# Patient Record
Sex: MALE | Race: WHITE | Employment: UNEMPLOYED | URBAN - METROPOLITAN AREA
[De-identification: names, ages, dates, MRNs, and addresses within clinical notes are randomized per-mention and may not be internally consistent; named-entity substitution may affect disease eponyms.]

---

## 2017-02-25 ENCOUNTER — HOSPITAL ENCOUNTER (EMERGENCY)
Age: 27
Discharge: HOME | End: 2017-02-25
Attending: EMERGENCY MEDICINE
Payer: COMMERCIAL

## 2017-02-25 VITALS
DIASTOLIC BLOOD PRESSURE: 76 MMHG | SYSTOLIC BLOOD PRESSURE: 135 MMHG | HEART RATE: 88 BPM | TEMPERATURE: 97.6 F | WEIGHT: 195 LBS | OXYGEN SATURATION: 99 % | BODY MASS INDEX: 26.41 KG/M2 | HEIGHT: 72 IN | RESPIRATION RATE: 18 BRPM

## 2017-02-25 DIAGNOSIS — S16.1XXA CERVICAL STRAIN, INITIAL ENCOUNTER: Primary | ICD-10-CM

## 2017-02-25 PROCEDURE — 99283 EMERGENCY DEPT VISIT LOW MDM: CPT

## 2017-02-25 RX ORDER — METHOCARBAMOL 750 MG/1
750 TABLET, FILM COATED ORAL 4 TIMES DAILY
Qty: 20 TAB | Refills: 0 | Status: SHIPPED | OUTPATIENT
Start: 2017-02-25 | End: 2017-05-18

## 2017-02-25 RX ORDER — NAPROXEN 375 MG/1
375 TABLET ORAL 2 TIMES DAILY WITH MEALS
Qty: 30 TAB | Refills: 0 | Status: SHIPPED | OUTPATIENT
Start: 2017-02-25 | End: 2017-05-18
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
## 2017-02-25 NOTE — DISCHARGE INSTRUCTIONS
Neck Strain: Care Instructions  Your Care Instructions  You have strained the muscles and ligaments in your neck. A sudden, awkward movement can strain the neck. This often occurs with falls or car accidents or during certain sports. Everyday activities like working on a computer or sleeping can also cause neck strain if they force you to hold your neck in an awkward position for a long time. It is common for neck pain to get worse for a day or two after an injury, but it should start to feel better after that. You may have more pain and stiffness for several days before it gets better. This is expected. It may take a few weeks or longer for it to heal completely. Good home treatment can help you get better faster and avoid future neck problems. Follow-up care is a key part of your treatment and safety. Be sure to make and go to all appointments, and call your doctor if you are having problems. It's also a good idea to know your test results and keep a list of the medicines you take. How can you care for yourself at home? · If you were given a neck brace (cervical collar) to limit neck motion, wear it as instructed for as many days as your doctor tells you to. Do not wear it longer than you were told to. Wearing a brace for too long can make neck stiffness worse and weaken the neck muscles. · You can try using heat or ice to see if it helps. ¨ Try using a heating pad on a low or medium setting for 15 to 20 minutes every 2 to 3 hours. Try a warm shower in place of one session with the heating pad. You can also buy single-use heat wraps that last up to 8 hours. ¨ You can also try an ice pack for 10 to 15 minutes every 2 to 3 hours. · Take pain medicines exactly as directed. ¨ If the doctor gave you a prescription medicine for pain, take it as prescribed. ¨ If you are not taking a prescription pain medicine, ask your doctor if you can take an over-the-counter medicine.   · Gently rub the area to relieve pain and help with blood flow. Do not massage the area if it hurts to do so. · Do not do anything that makes the pain worse. Take it easy for a couple of days. You can do your usual activities if they do not hurt your neck or put it at risk for more stress or injury. · Try sleeping on a special neck pillow. Place it under your neck, not under your head. Placing a tightly rolled-up towel under your neck while you sleep will also work. If you use a neck pillow or rolled towel, do not use your regular pillow at the same time. · To prevent future neck pain, do exercises to stretch and strengthen your neck and back. Learn how to use good posture, safe lifting techniques, and proper body mechanics. When should you call for help? Call 911 anytime you think you may need emergency care. For example, call if:  · You are unable to move an arm or a leg at all. Call your doctor now or seek immediate medical care if:  · You have new or worse symptoms in your arms, legs, chest, belly, or buttocks. Symptoms may include:  ¨ Numbness or tingling. ¨ Weakness. ¨ Pain. · You lose bladder or bowel control. Watch closely for changes in your health, and be sure to contact your doctor if:  · You are not getting better as expected. Where can you learn more? Go to http://julianna-jose.info/. Enter M253 in the search box to learn more about \"Neck Strain: Care Instructions. \"  Current as of: May 23, 2016  Content Version: 11.1  © 1896-8493 [employer]. Care instructions adapted under license by [employer] (which disclaims liability or warranty for this information). If you have questions about a medical condition or this instruction, always ask your healthcare professional. [address] any warranty or liability for your use of this information.

## 2024-03-28 RX ORDER — TADALAFIL 5 MG/1
5 TABLET ORAL PRN
COMMUNITY

## 2024-03-28 NOTE — PROGRESS NOTES
Instructions for your surgery at [facility]      Today's Date:  3/28/2024      Patient's Name:  Isidro Calix           Surgery Date:  04/08/2024              Please enter the main entrance of the hospital and check-in at the  located in the lobby. Once checked in at the , you will take the elevators to the second floor, and report to the waiting room on the left. The room will say Procedure Registration.    Do NOT eat or drink anything, including candy, gum, or ice chips after midnight prior to your surgery, unless you have specific instructions from your surgeon or anesthesia provider to do so.  Brush your teeth before coming to the hospital. You may swish with water, but do not swallow.  No smoking/Vaping/E-Cigarettes 24 hours prior to the day of surgery.  No alcohol 24 hours prior to the day of surgery.  No recreational drugs for one week prior to the day of surgery.  Bring Photo ID, Insurance information, and Co-pay if required on day of surgery.  Bring in pertinent legal documents, such as, Medical Power of , DNR, Advance Directive, etc.  Leave all valuables, including money/purse, at home.  Remove all jewelry, including ALL body piercings, nail polish, acrylic nails, and makeup (including mascara); no lotions, powders, deodorant, or perfume/cologne/after shave on the skin.  Follow instruction for Hibiclens washes and CHG wipes from surgeon's office.   Glasses and dentures may be worn to the hospital. They must be removed prior to surgery. Please bring case/container for glasses or dentures.   Contact lenses should not be worn on day of surgery.   Call your doctor's office if symptoms of a cold or illness develop within 24-48 hours prior to your surgery.  Call your doctor's office if you have any questions concerning insurance or co-pays.  15. AN ADULT (relative or friend 18 years or older) MUST DRIVE YOU HOME AFTER YOUR SURGERY.  16. Please make

## 2024-04-02 ENCOUNTER — HOSPITAL ENCOUNTER (OUTPATIENT)
Facility: HOSPITAL | Age: 34
Discharge: HOME | End: 2024-04-05
Payer: COMMERCIAL

## 2024-04-02 ENCOUNTER — TRANSCRIBE ORDERS (OUTPATIENT)
Facility: HOSPITAL | Age: 34
End: 2024-04-02

## 2024-04-02 DIAGNOSIS — D48.5 NEOPLASM OF UNCERTAIN BEHAVIOR OF SKIN: ICD-10-CM

## 2024-04-02 DIAGNOSIS — D48.5 NEOPLASM OF UNCERTAIN BEHAVIOR OF SKIN: Primary | ICD-10-CM

## 2024-04-02 LAB
ANION GAP SERPL CALC-SCNC: 2 MMOL/L (ref 3–18)
APTT PPP: 28.2 SEC (ref 23–36.4)
BASOPHILS # BLD: 0 K/UL (ref 0–0.1)
BASOPHILS NFR BLD: 1 % (ref 0–2)
BUN SERPL-MCNC: 16 MG/DL (ref 7–18)
BUN/CREAT SERPL: 17 (ref 12–20)
CALCIUM SERPL-MCNC: 9.2 MG/DL (ref 8.5–10.1)
CHLORIDE SERPL-SCNC: 105 MMOL/L (ref 100–111)
CO2 SERPL-SCNC: 32 MMOL/L (ref 21–32)
CREAT SERPL-MCNC: 0.92 MG/DL (ref 0.6–1.3)
DIFFERENTIAL METHOD BLD: ABNORMAL
EOSINOPHIL # BLD: 0.1 K/UL (ref 0–0.4)
EOSINOPHIL NFR BLD: 3 % (ref 0–5)
ERYTHROCYTE [DISTWIDTH] IN BLOOD BY AUTOMATED COUNT: 11.7 % (ref 11.6–14.5)
GLUCOSE SERPL-MCNC: 98 MG/DL (ref 74–99)
HCT VFR BLD AUTO: 44.8 % (ref 36–48)
HGB BLD-MCNC: 15.5 G/DL (ref 13–16)
IMM GRANULOCYTES # BLD AUTO: 0 K/UL (ref 0–0.04)
IMM GRANULOCYTES NFR BLD AUTO: 0 % (ref 0–0.5)
INR PPP: 1 (ref 0.9–1.1)
LYMPHOCYTES # BLD: 0.8 K/UL (ref 0.9–3.6)
LYMPHOCYTES NFR BLD: 20 % (ref 21–52)
MCH RBC QN AUTO: 29.6 PG (ref 24–34)
MCHC RBC AUTO-ENTMCNC: 34.6 G/DL (ref 31–37)
MCV RBC AUTO: 85.7 FL (ref 78–100)
MONOCYTES # BLD: 0.3 K/UL (ref 0.05–1.2)
MONOCYTES NFR BLD: 7 % (ref 3–10)
NEUTS SEG # BLD: 2.9 K/UL (ref 1.8–8)
NEUTS SEG NFR BLD: 69 % (ref 40–73)
NRBC # BLD: 0 K/UL (ref 0–0.01)
NRBC BLD-RTO: 0 PER 100 WBC
PLATELET # BLD AUTO: 241 K/UL (ref 135–420)
PMV BLD AUTO: 9.3 FL (ref 9.2–11.8)
POTASSIUM SERPL-SCNC: 4.9 MMOL/L (ref 3.5–5.5)
PROTHROMBIN TIME: 13.1 SEC (ref 11.9–14.7)
RBC # BLD AUTO: 5.23 M/UL (ref 4.35–5.65)
SODIUM SERPL-SCNC: 139 MMOL/L (ref 136–145)
WBC # BLD AUTO: 4.2 K/UL (ref 4.6–13.2)

## 2024-04-02 PROCEDURE — 80048 BASIC METABOLIC PNL TOTAL CA: CPT

## 2024-04-02 PROCEDURE — 36415 COLL VENOUS BLD VENIPUNCTURE: CPT

## 2024-04-02 PROCEDURE — 85025 COMPLETE CBC W/AUTO DIFF WBC: CPT

## 2024-04-02 PROCEDURE — 85610 PROTHROMBIN TIME: CPT

## 2024-04-02 PROCEDURE — 85730 THROMBOPLASTIN TIME PARTIAL: CPT

## 2024-04-05 ENCOUNTER — ANESTHESIA EVENT (OUTPATIENT)
Facility: HOSPITAL | Age: 34
End: 2024-04-05
Payer: COMMERCIAL

## 2024-04-08 ENCOUNTER — HOSPITAL ENCOUNTER (OUTPATIENT)
Facility: HOSPITAL | Age: 34
Setting detail: OUTPATIENT SURGERY
Discharge: HOME | End: 2024-04-08
Attending: PODIATRIST | Admitting: PODIATRIST
Payer: COMMERCIAL

## 2024-04-08 ENCOUNTER — ANESTHESIA (OUTPATIENT)
Facility: HOSPITAL | Age: 34
End: 2024-04-08
Payer: COMMERCIAL

## 2024-04-08 VITALS
DIASTOLIC BLOOD PRESSURE: 74 MMHG | RESPIRATION RATE: 16 BRPM | HEART RATE: 61 BPM | BODY MASS INDEX: 23.7 KG/M2 | TEMPERATURE: 96.9 F | HEIGHT: 73 IN | SYSTOLIC BLOOD PRESSURE: 114 MMHG | OXYGEN SATURATION: 100 % | WEIGHT: 178.8 LBS

## 2024-04-08 PROCEDURE — 6360000002 HC RX W HCPCS: Performed by: ANESTHESIOLOGY

## 2024-04-08 PROCEDURE — 7100000000 HC PACU RECOVERY - FIRST 15 MIN: Performed by: PODIATRIST

## 2024-04-08 PROCEDURE — 3700000000 HC ANESTHESIA ATTENDED CARE: Performed by: PODIATRIST

## 2024-04-08 PROCEDURE — 7100000011 HC PHASE II RECOVERY - ADDTL 15 MIN: Performed by: PODIATRIST

## 2024-04-08 PROCEDURE — 2500000003 HC RX 250 WO HCPCS: Performed by: ANESTHESIOLOGY

## 2024-04-08 PROCEDURE — 7100000010 HC PHASE II RECOVERY - FIRST 15 MIN: Performed by: PODIATRIST

## 2024-04-08 PROCEDURE — 7100000001 HC PACU RECOVERY - ADDTL 15 MIN: Performed by: PODIATRIST

## 2024-04-08 PROCEDURE — 6370000000 HC RX 637 (ALT 250 FOR IP): Performed by: NURSE ANESTHETIST, CERTIFIED REGISTERED

## 2024-04-08 PROCEDURE — 2500000003 HC RX 250 WO HCPCS: Performed by: PODIATRIST

## 2024-04-08 PROCEDURE — C9290 INJ, BUPIVACAINE LIPOSOME: HCPCS | Performed by: PODIATRIST

## 2024-04-08 PROCEDURE — 6360000002 HC RX W HCPCS: Performed by: PODIATRIST

## 2024-04-08 PROCEDURE — 2709999900 HC NON-CHARGEABLE SUPPLY: Performed by: PODIATRIST

## 2024-04-08 PROCEDURE — 3600000012 HC SURGERY LEVEL 2 ADDTL 15MIN: Performed by: PODIATRIST

## 2024-04-08 PROCEDURE — 3700000001 HC ADD 15 MINUTES (ANESTHESIA): Performed by: PODIATRIST

## 2024-04-08 PROCEDURE — 2580000003 HC RX 258: Performed by: ANESTHESIOLOGY

## 2024-04-08 PROCEDURE — 2580000003 HC RX 258: Performed by: NURSE ANESTHETIST, CERTIFIED REGISTERED

## 2024-04-08 PROCEDURE — 88305 TISSUE EXAM BY PATHOLOGIST: CPT

## 2024-04-08 PROCEDURE — 3600000002 HC SURGERY LEVEL 2 BASE: Performed by: PODIATRIST

## 2024-04-08 RX ORDER — SODIUM CHLORIDE 0.9 % (FLUSH) 0.9 %
5-40 SYRINGE (ML) INJECTION EVERY 12 HOURS SCHEDULED
Status: DISCONTINUED | OUTPATIENT
Start: 2024-04-08 | End: 2024-04-08 | Stop reason: HOSPADM

## 2024-04-08 RX ORDER — SODIUM CHLORIDE 0.9 % (FLUSH) 0.9 %
5-40 SYRINGE (ML) INJECTION PRN
Status: DISCONTINUED | OUTPATIENT
Start: 2024-04-08 | End: 2024-04-08 | Stop reason: HOSPADM

## 2024-04-08 RX ORDER — FAMOTIDINE 20 MG/1
20 TABLET, FILM COATED ORAL ONCE
Status: COMPLETED | OUTPATIENT
Start: 2024-04-08 | End: 2024-04-08

## 2024-04-08 RX ORDER — PROPOFOL 10 MG/ML
INJECTION, EMULSION INTRAVENOUS PRN
Status: DISCONTINUED | OUTPATIENT
Start: 2024-04-08 | End: 2024-04-08 | Stop reason: SDUPTHER

## 2024-04-08 RX ORDER — NALOXONE HYDROCHLORIDE 0.4 MG/ML
INJECTION, SOLUTION INTRAMUSCULAR; INTRAVENOUS; SUBCUTANEOUS PRN
Status: DISCONTINUED | OUTPATIENT
Start: 2024-04-08 | End: 2024-04-08 | Stop reason: HOSPADM

## 2024-04-08 RX ORDER — CLINDAMYCIN PHOSPHATE 600 MG/50ML
600 INJECTION, SOLUTION INTRAVENOUS
Status: COMPLETED | OUTPATIENT
Start: 2024-04-08 | End: 2024-04-08

## 2024-04-08 RX ORDER — LIDOCAINE HYDROCHLORIDE 20 MG/ML
INJECTION, SOLUTION EPIDURAL; INFILTRATION; INTRACAUDAL; PERINEURAL PRN
Status: DISCONTINUED | OUTPATIENT
Start: 2024-04-08 | End: 2024-04-08 | Stop reason: ALTCHOICE

## 2024-04-08 RX ORDER — SODIUM CHLORIDE, SODIUM LACTATE, POTASSIUM CHLORIDE, CALCIUM CHLORIDE 600; 310; 30; 20 MG/100ML; MG/100ML; MG/100ML; MG/100ML
INJECTION, SOLUTION INTRAVENOUS CONTINUOUS
Status: DISCONTINUED | OUTPATIENT
Start: 2024-04-08 | End: 2024-04-08 | Stop reason: HOSPADM

## 2024-04-08 RX ORDER — MIDAZOLAM HYDROCHLORIDE 1 MG/ML
INJECTION INTRAMUSCULAR; INTRAVENOUS PRN
Status: DISCONTINUED | OUTPATIENT
Start: 2024-04-08 | End: 2024-04-08 | Stop reason: SDUPTHER

## 2024-04-08 RX ORDER — SODIUM CHLORIDE 9 MG/ML
INJECTION, SOLUTION INTRAVENOUS PRN
Status: DISCONTINUED | OUTPATIENT
Start: 2024-04-08 | End: 2024-04-08 | Stop reason: HOSPADM

## 2024-04-08 RX ORDER — DEXAMETHASONE SODIUM PHOSPHATE 4 MG/ML
INJECTION, SOLUTION INTRA-ARTICULAR; INTRALESIONAL; INTRAMUSCULAR; INTRAVENOUS; SOFT TISSUE PRN
Status: DISCONTINUED | OUTPATIENT
Start: 2024-04-08 | End: 2024-04-08 | Stop reason: SDUPTHER

## 2024-04-08 RX ORDER — LIDOCAINE HYDROCHLORIDE 20 MG/ML
INJECTION, SOLUTION EPIDURAL; INFILTRATION; INTRACAUDAL; PERINEURAL PRN
Status: DISCONTINUED | OUTPATIENT
Start: 2024-04-08 | End: 2024-04-08 | Stop reason: SDUPTHER

## 2024-04-08 RX ORDER — FENTANYL CITRATE 50 UG/ML
INJECTION, SOLUTION INTRAMUSCULAR; INTRAVENOUS PRN
Status: DISCONTINUED | OUTPATIENT
Start: 2024-04-08 | End: 2024-04-08 | Stop reason: SDUPTHER

## 2024-04-08 RX ADMIN — FENTANYL CITRATE 50 MCG: 50 INJECTION INTRAMUSCULAR; INTRAVENOUS at 13:05

## 2024-04-08 RX ADMIN — SODIUM CHLORIDE, POTASSIUM CHLORIDE, SODIUM LACTATE AND CALCIUM CHLORIDE: 600; 310; 30; 20 INJECTION, SOLUTION INTRAVENOUS at 12:13

## 2024-04-08 RX ADMIN — DEXMEDETOMIDINE HYDROCHLORIDE 4 MCG: 100 INJECTION, SOLUTION INTRAVENOUS at 13:02

## 2024-04-08 RX ADMIN — DEXAMETHASONE SODIUM PHOSPHATE 4 MG: 4 INJECTION INTRA-ARTICULAR; INTRALESIONAL; INTRAMUSCULAR; INTRAVENOUS; SOFT TISSUE at 13:18

## 2024-04-08 RX ADMIN — CLINDAMYCIN PHOSPHATE 600 MG: 150 INJECTION, SOLUTION INTRAMUSCULAR; INTRAVENOUS at 13:05

## 2024-04-08 RX ADMIN — LIDOCAINE HYDROCHLORIDE 25 MG: 20 INJECTION, SOLUTION EPIDURAL; INFILTRATION; INTRACAUDAL; PERINEURAL at 13:05

## 2024-04-08 RX ADMIN — MIDAZOLAM 2 MG: 1 INJECTION, SOLUTION INTRAMUSCULAR; INTRAVENOUS at 12:58

## 2024-04-08 RX ADMIN — DEXMEDETOMIDINE HYDROCHLORIDE 6 MCG: 100 INJECTION, SOLUTION INTRAVENOUS at 12:58

## 2024-04-08 RX ADMIN — PROPOFOL 50 MG: 10 INJECTION, EMULSION INTRAVENOUS at 13:05

## 2024-04-08 RX ADMIN — FAMOTIDINE 20 MG: 20 TABLET ORAL at 12:12

## 2024-04-08 RX ADMIN — PROPOFOL 50 MG: 10 INJECTION, EMULSION INTRAVENOUS at 13:17

## 2024-04-08 RX ADMIN — FENTANYL CITRATE 50 MCG: 50 INJECTION INTRAMUSCULAR; INTRAVENOUS at 13:01

## 2024-04-08 ASSESSMENT — PAIN - FUNCTIONAL ASSESSMENT
PAIN_FUNCTIONAL_ASSESSMENT: 0-10
PAIN_FUNCTIONAL_ASSESSMENT: NONE - DENIES PAIN

## 2024-04-08 NOTE — OP NOTE
[address] [place]  [address]                            OPERATIVE REPORT      PATIENT NAME: DARIEN GORE          : 1990  MED REC NO: [id number]                       ROOM: OR  ACCOUNT NO: [id number]                       ADMIT DATE: 2024  PROVIDER: Nikhil Montalvo DPM    DATE OF SERVICE:  2024    PREOPERATIVE DIAGNOSES:  ***    POSTOPERATIVE DIAGNOSES:  ***    PROCEDURES PERFORMED:  Excision of two ***.    SURGEON:  Nikhil Montalvo DPM    ASSISTANT:  ***    ANESTHESIA:  ***.    ESTIMATED BLOOD LOSS:  None.    SPECIMENS REMOVED:  Soft tissue growths.    INTRAOPERATIVE FINDINGS:  ***     COMPLICATIONS:  None.    IMPLANTS:  None.    INDICATIONS:  ***    DESCRIPTION OF PROCEDURE:  On 2024, the patient was placed on the operative table in the supine position.  After adequate induction of MAC anesthesia, left lower extremity was prepped and draped in the usual sterile fashion.  Attention was directed to *** left foot.  Approximately, 2 cm *** was debrided.  *** tight tissue deep into the deep fascia.  Using Sherman elevator, I *** completely excised the lesions and sent them for pathology *** scar tissue *** deep fascial membrane *** any further tissue ***.  Gelfoam dressing was applied with a pressure dressing.  The patient tolerated the procedure and anesthesia well with vital signs stable throughout.  The patient was transported to the recovery room in stable condition ***.        NIKHIL MONTALVO DPM      PRG/AQS  D:  2024 15:29:29  T:  2024 16:11:53  JOB #:  [id number]

## 2024-04-08 NOTE — INTERVAL H&P NOTE
Update History & Physical    The patient's History and Physical of April 8, surgery was reviewed with the patient and I examined the patient. There was no change. The surgical site was confirmed by the patient and me.     Plan: The risks, benefits, expected outcome, and alternative to the recommended procedure have been discussed with the patient. Patient understands and wants to proceed with the procedure.     Electronically signed by Nikhil Hamm DPM on 4/8/2024 at 12:38 PM

## 2024-04-08 NOTE — PERIOP NOTE
Patient /Family /Designee has been informed that [facility] is not responsible for patient belongings per policy and the signed [facility] Patient Agreement document.  Personal items should be sent home or checked in with security.  Patient /Family /Designee selected the following action:                            [x]  Send personal items home with a family member or friend                                                 []  Check in personal items with security, excluding clothing                            []  Maintain personal items at the bedside, against recommendation                                 by Pb Ramírez [facility]                                   ** If patient /family /designee chooses to maintain personal items at the bedside,                                      Complete the patient belongings inventory in the EMR.

## 2024-04-08 NOTE — DISCHARGE INSTRUCTIONS
Ice and elevation  first 24 hours.   Can shower and remove dressing in 48 hours.   Apply neosporin or bacitracin ointment with Band-Aid   Already has postop appointment   Will send pain Rx to his pharmacy       DISCHARGE SUMMARY from Nurse    PATIENT INSTRUCTIONS:    After general anesthesia or intravenous sedation, for 24 hours or while taking prescription Narcotics:  Limit your activities  Do not drive and operate hazardous machinery  Do not make important personal or business decisions  Do  not drink alcoholic beverages  If you have not urinated within 8 hours after discharge, please contact your surgeon on call.    Report the following to your surgeon:  Excessive pain, swelling, redness or odor of or around the surgical area  Temperature over 100.5  Nausea and vomiting lasting longer than 4 hours or if unable to take medications  Any signs of decreased circulation or nerve impairment to extremity: change in color, persistent  numbness, tingling, coldness or increase pain  Any questions    These are general instructions for a healthy lifestyle:    No smoking/ No tobacco products/ Avoid exposure to second hand smoke  Surgeon General's Warning:  Quitting smoking now greatly reduces serious risk to your health.    Obesity, smoking, and sedentary lifestyle greatly increases your risk for illness    A healthy diet, regular physical exercise & weight monitoring are important for maintaining a healthy lifestyle    You may be retaining fluid if you have a history of heart failure or if you experience any of the following symptoms:  Weight gain of 3 pounds or more overnight or 5 pounds in a week, increased swelling in our hands or feet or shortness of breath while lying flat in bed.  Please call your doctor as soon as you notice any of these symptoms; do not wait until your next office visit.        The discharge information has been reviewed with the patient.  The patient verbalized understanding.  Discharge medications

## 2024-04-08 NOTE — ANESTHESIA PRE PROCEDURE
Department of Anesthesiology  Preprocedure Note       Name:  Isidro Calix   Age:  33 y.o.  :  1990                                          MRN:  [id number]         Date:  2024      Surgeon: Surgeon(s):  Nikhil Hamm DPM    Procedure: Procedure(s):  EXCISION NEOPLASM RIGHT FOOT    Medications prior to admission:   Prior to Admission medications    Medication Sig Start Date End Date Taking? Authorizing Provider   Multiple Vitamins-Minerals (CENTRUM SILVER PO) Take by mouth   Yes ProviderGladis MD   tadalafil (CIALIS) 5 MG tablet Take 1 tablet by mouth as needed for Erectile Dysfunction   Yes Provider, MD Gladis       Current medications:    Current Facility-Administered Medications   Medication Dose Route Frequency Provider Last Rate Last Admin   • lactated ringers IV soln infusion   IntraVENous Continuous Soledad Snow APRN - CRNA 75 mL/hr at 24 1213 New Bag at 24 1213   • sodium chloride flush 0.9 % injection 5-40 mL  5-40 mL IntraVENous 2 times per day Soledad Snow APRN - CRNA       • sodium chloride flush 0.9 % injection 5-40 mL  5-40 mL IntraVENous PRN Soledad Snow APRN - CRNA       • 0.9 % sodium chloride infusion   IntraVENous PRN Soledad Snow APRN - CRNA       • clindamycin (CLEOCIN) 600 mg in 50 mL NS IVPB  600 mg IntraVENous On Call to OR Nikhil Hamm DPM           Allergies:    Allergies   Allergen Reactions   • Ceftin [Cefuroxime] Swelling     Hand swelling       Problem List:    Patient Active Problem List   Diagnosis Code   • Anxiety F41.9   • Erectile dysfunction N52.9       Past Medical History:  History reviewed. No pertinent past medical history.    Past Surgical History:        Procedure Laterality Date   • SEPTORHINOPLASTY         Social History:    Social History     Tobacco Use   • Smoking status: Never   • Smokeless tobacco: Never   Substance Use Topics   • Alcohol use: Yes     Comment: socially

## 2024-04-08 NOTE — BRIEF OP NOTE
Brief Postoperative Note      Patient: Isidro Calix  YOB: 1990  MRN: [id number]    Date of Procedure: 4/8/2024    Pre-Op Diagnosis Codes:     * Neoplasm of uncertain behavior of skin [D48.5]    Post-Op Diagnosis: Same       Procedure(s):  EXCISION NEOPLASM RIGHT FOOT    Surgeon(s):  Nikhil Hamm DPM    Assistant:  Surgical Assistant: Tori Gaytan    Anesthesia: Monitor Anesthesia Care    Estimated Blood Loss (mL): Minimal    Complications: None    Specimens:   ID Type Source Tests Collected by Time Destination   A : TUMOR/WART RIGHT FOOT Tissue Tissue SURGICAL PATHOLOGY Nikhil Hamm DPM 4/8/2024 1319        Implants:  * No implants in log *      Drains: * No LDAs found *    Findings:  Infection Present At Time Of Surgery (PATOS) (choose all levels that have infection present):  No infection present  Other Findings: excision wart like lesion  This procedure was not performed to treat primary cutaneous melanoma through wide local excision    Electronically signed by Nikhil Hamm DPM on 4/8/2024 at 1:30 PM

## 2024-04-08 NOTE — ANESTHESIA POSTPROCEDURE EVALUATION
Department of Anesthesiology  Postprocedure Note    Patient: Isidro Calix  MRN: [id number]  YOB: 1990  Date of evaluation: 4/8/2024    Procedure Summary       Date: 04/08/24 Room / Location: [facility] MAIN 01 / [facility] MAIN OR    Anesthesia Start: 1258 Anesthesia Stop: 1351    Procedure: EXCISION NEOPLASM RIGHT FOOT (Right: Foot) Diagnosis:       Neoplasm of uncertain behavior of skin      (Neoplasm of uncertain behavior of skin [D48.5])    Surgeons: Nikhil Hamm DPM Responsible Provider: Lee Pulliam MD    Anesthesia Type: MAC ASA Status: 1            Anesthesia Type: MAC    Brody Phase I: Brody Score: 10    Brody Phase II: Brody Score: 10    Anesthesia Post Evaluation    Patient location during evaluation: bedside  Patient participation: complete - patient participated  Level of consciousness: responsive to verbal stimuli  Airway patency: patent  Nausea & Vomiting: no nausea  Respiratory status: acceptable  Hydration status: euvolemic    No notable events documented.

## (undated) DEVICE — STRIP SKIN CLSR 1/4INX1.5IN REINF WND NYL CURAD

## (undated) DEVICE — UNDERCAST PADDING: Brand: DEROYAL

## (undated) DEVICE — DRAPE,EXTREMITY,89X128,STERILE: Brand: MEDLINE

## (undated) DEVICE — GAUZE,SPONGE,4"X4",16PLY,STRL,LF,10/TRAY: Brand: MEDLINE

## (undated) DEVICE — SHOE POSTOP M MAN 9-11 UNIV FOAM TRICOT SEMI FLX SKID

## (undated) DEVICE — KIT OR TURNOVER

## (undated) DEVICE — PACK PROCEDURE SURG MAJ W/ BASIN LF

## (undated) DEVICE — Device: Brand: JELCO

## (undated) DEVICE — NEEDLE 25GA X 1.5 IN ECLIPSE

## (undated) DEVICE — GLOVE ORTHO 8   MSG9480

## (undated) DEVICE — TABLE COVER: Brand: CONVERTORS

## (undated) DEVICE — THREE-QUARTER SHEET: Brand: CONVERTORS

## (undated) DEVICE — 3 ML SYRINGE WITH HYPODERMIC SAFETY NEEDLE: Brand: MAGELLAN

## (undated) DEVICE — DRAPE TWL SURG 16X26IN BLU ORB04] ALLCARE INC]

## (undated) DEVICE — MAYO STAND COVER: Brand: CONVERTORS

## (undated) DEVICE — ELECTRODE PT RET AD L9FT HI MOIST COND ADH HYDRGEL CORDED

## (undated) DEVICE — SUTURE VICRYL SZ 4-0 L18IN ABSRB UD L19MM PC-5 3/8 CIR J823G

## (undated) DEVICE — STOCKINETTE,IMPERVIOUS,12X48,STERILE: Brand: MEDLINE

## (undated) DEVICE — WRAP COMPR W4INXL5YD NONSTERILE TAN SELF ADH COBAN

## (undated) DEVICE — MASTISOL ADHESIVE LIQ 2/3ML

## (undated) DEVICE — DISPOSABLE TOURNIQUET CUFF SINGLE BLADDER, DUAL PORT AND QUICK CONNECT CONNECTOR: Brand: COLOR CUFF

## (undated) DEVICE — BANDAGE COBAN 4 IN COMPR W4INXL5YD FOAM COHESIVE QUIK STK SELF ADH SFT

## (undated) DEVICE — BANDAGE,GAUZE,BULKEE II,4.5"X4.1YD,STRL: Brand: MEDLINE

## (undated) DEVICE — BLADE CLIPPER GEN PURP NS

## (undated) DEVICE — INTENDED FOR TISSUE SEPARATION, AND OTHER PROCEDURES THAT REQUIRE A SHARP SURGICAL BLADE TO PUNCTURE OR CUT.: Brand: BARD-PARKER SAFETY BLADES SIZE 10, STERILE

## (undated) DEVICE — APPLICATOR MEDICATED 26 CC SOLUTION HI LT ORNG CHLORAPREP

## (undated) DEVICE — SHOE POSTOP M WOMAN 6-8 UNIV FOAM TRICOT SEMI FLX SKID

## (undated) DEVICE — GOWN,REINFORCED,POLY,AURORA,XLARGE,STRL: Brand: MEDLINE

## (undated) DEVICE — BLADE ES L2.75IN ELASTOMERIC COAT DURABLE BEND UPTO 90DEG

## (undated) DEVICE — SYRINGE MED 10ML LUERLOCK TIP W/O SFTY DISP